# Patient Record
Sex: MALE | Race: WHITE | NOT HISPANIC OR LATINO | URBAN - METROPOLITAN AREA
[De-identification: names, ages, dates, MRNs, and addresses within clinical notes are randomized per-mention and may not be internally consistent; named-entity substitution may affect disease eponyms.]

---

## 2024-11-02 ENCOUNTER — EMERGENCY (EMERGENCY)
Facility: HOSPITAL | Age: 89
LOS: 1 days | Discharge: HOME | End: 2024-11-02
Attending: EMERGENCY MEDICINE | Admitting: EMERGENCY MEDICINE
Payer: COMMERCIAL

## 2024-11-02 VITALS
SYSTOLIC BLOOD PRESSURE: 139 MMHG | DIASTOLIC BLOOD PRESSURE: 77 MMHG | HEIGHT: 70 IN | RESPIRATION RATE: 18 BRPM | TEMPERATURE: 98 F | WEIGHT: 175.05 LBS | OXYGEN SATURATION: 98 % | HEART RATE: 74 BPM

## 2024-11-02 VITALS
OXYGEN SATURATION: 96 % | TEMPERATURE: 98 F | SYSTOLIC BLOOD PRESSURE: 128 MMHG | HEART RATE: 55 BPM | RESPIRATION RATE: 16 BRPM | DIASTOLIC BLOOD PRESSURE: 67 MMHG

## 2024-11-02 DIAGNOSIS — I10 ESSENTIAL (PRIMARY) HYPERTENSION: ICD-10-CM

## 2024-11-02 DIAGNOSIS — Z23 ENCOUNTER FOR IMMUNIZATION: ICD-10-CM
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
## 2024-11-02 DIAGNOSIS — S09.93XA UNSPECIFIED INJURY OF FACE, INITIAL ENCOUNTER: ICD-10-CM

## 2024-11-02 DIAGNOSIS — S80.212A ABRASION, LEFT KNEE, INITIAL ENCOUNTER: ICD-10-CM

## 2024-11-02 DIAGNOSIS — S01.21XA LACERATION WITHOUT FOREIGN BODY OF NOSE, INITIAL ENCOUNTER: ICD-10-CM
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
## 2024-11-02 DIAGNOSIS — Z88.1 ALLERGY STATUS TO OTHER ANTIBIOTIC AGENTS: ICD-10-CM

## 2024-11-02 DIAGNOSIS — Z91.040 LATEX ALLERGY STATUS: ICD-10-CM

## 2024-11-02 DIAGNOSIS — M19.90 UNSPECIFIED OSTEOARTHRITIS, UNSPECIFIED SITE: ICD-10-CM

## 2024-11-02 DIAGNOSIS — M48.00 SPINAL STENOSIS, SITE UNSPECIFIED: ICD-10-CM

## 2024-11-02 DIAGNOSIS — I25.10 ATHEROSCLEROTIC HEART DISEASE OF NATIVE CORONARY ARTERY WITHOUT ANGINA PECTORIS: ICD-10-CM

## 2024-11-02 DIAGNOSIS — Z79.82 LONG TERM (CURRENT) USE OF ASPIRIN: ICD-10-CM

## 2024-11-02 PROCEDURE — 90471 IMMUNIZATION ADMIN: CPT

## 2024-11-02 PROCEDURE — 12011 RPR F/E/E/N/L/M 2.5 CM/<: CPT

## 2024-11-02 PROCEDURE — 82962 GLUCOSE BLOOD TEST: CPT

## 2024-11-02 PROCEDURE — 72125 CT NECK SPINE W/O DYE: CPT | Mod: 26,MC

## 2024-11-02 PROCEDURE — 70450 CT HEAD/BRAIN W/O DYE: CPT | Mod: MC

## 2024-11-02 PROCEDURE — 99284 EMERGENCY DEPT VISIT MOD MDM: CPT | Mod: 25

## 2024-11-02 PROCEDURE — 90715 TDAP VACCINE 7 YRS/> IM: CPT

## 2024-11-02 PROCEDURE — 70486 CT MAXILLOFACIAL W/O DYE: CPT | Mod: 26,MC

## 2024-11-02 PROCEDURE — 70450 CT HEAD/BRAIN W/O DYE: CPT | Mod: 26,MC

## 2024-11-02 PROCEDURE — 72125 CT NECK SPINE W/O DYE: CPT | Mod: MC

## 2024-11-02 PROCEDURE — 70486 CT MAXILLOFACIAL W/O DYE: CPT | Mod: MC

## 2024-11-02 RX ORDER — ACETAMINOPHEN 500 MG
650 TABLET ORAL ONCE
Refills: 0 | Status: COMPLETED | OUTPATIENT
Start: 2024-11-02 | End: 2024-11-02

## 2024-11-02 RX ORDER — CLOSTRIDIUM TETANI TOXOID ANTIGEN (FORMALDEHYDE INACTIVATED), CORYNEBACTERIUM DIPHTHERIAE TOXOID ANTIGEN (FORMALDEHYDE INACTIVATED), BORDETELLA PERTUSSIS TOXOID ANTIGEN (GLUTARALDEHYDE INACTIVATED), BORDETELLA PERTUSSIS FILAMENTOUS HEMAGGLUTININ ANTIGEN (FORMALDEHYDE INACTIVATED), BORDETELLA PERTUSSIS PERTACTIN ANTIGEN, AND BORDETELLA PERTUSSIS FIMBRIAE 2/3 ANTIGEN 5; 2; 2.5; 5; 3; 5 [LF]/.5ML; [LF]/.5ML; UG/.5ML; UG/.5ML; UG/.5ML; UG/.5ML
0.5 INJECTION, SUSPENSION INTRAMUSCULAR ONCE
Refills: 0 | Status: COMPLETED | OUTPATIENT
Start: 2024-11-02 | End: 2024-11-02

## 2024-11-02 RX ADMIN — CLOSTRIDIUM TETANI TOXOID ANTIGEN (FORMALDEHYDE INACTIVATED), CORYNEBACTERIUM DIPHTHERIAE TOXOID ANTIGEN (FORMALDEHYDE INACTIVATED), BORDETELLA PERTUSSIS TOXOID ANTIGEN (GLUTARALDEHYDE INACTIVATED), BORDETELLA PERTUSSIS FILAMENTOUS HEMAGGLUTININ ANTIGEN (FORMALDEHYDE INACTIVATED), BORDETELLA PERTUSSIS PERTACTIN ANTIGEN, AND BORDETELLA PERTUSSIS FIMBRIAE 2/3 ANTIGEN 0.5 MILLILITER(S): 5; 2; 2.5; 5; 3; 5 INJECTION, SUSPENSION INTRAMUSCULAR at 17:26

## 2024-11-02 RX ADMIN — Medication 650 MILLIGRAM(S): at 17:26

## 2024-11-02 NOTE — ED ADULT TRIAGE NOTE - CHIEF COMPLAINT QUOTE
Single mechanical GLF with headstrike 1 hour PTA. Pt reports hx of spinal stenosis and felt his legs tense up and fell thereafter. Denies preceding dizziness, weakness, SOB, or chest pain. Denies active dizziness or chest pain. BEFAST (-). Laceration noted to nose and abrasion above R eye. Denies LOC. Reports daily ASA use. Alert and oriented at time of triage.

## 2024-11-02 NOTE — ED PROVIDER NOTE - PHYSICAL EXAMINATION
CONSTITUTIONAL: Well-appearing;  in no apparent distress.   HEAD: Normocephalic;   EYES: PERRL; EOM intact; conjunctiva and sclera clear  ENT: +1.5 cm lac to proximal nasal bridge, +abrasion to tip of nose. no septal hematoma. +abrasion above R upper lip. +abrasion above R forehead. no facial bone tenderness.   NECK: Supple; non-tender; no cspine tenderness   CARDIOVASCULAR: rrr,  RESPIRATORY: Breathing easily;   MSK: FROM at all extremities, normal tone; abrasion to L knee, no bony tenderness, FROM. No tenderness to hip. Pt ambulatory with cane   EXT: No cyanosis or edema; N/V intact  SKIN: Normal for age and race; warm; dry; good turgor; no apparent lesions or rash.  Neuro: AAO x 3, CN II-XII intact, normal speech, strength 5/5 bilateral upper and lower extremities, sensation intact, cerebellum intact, no ataxia, follows commands appropriately

## 2024-11-02 NOTE — ED PROVIDER NOTE - PATIENT PORTAL LINK FT
You can access the FollowMyHealth Patient Portal offered by [facility] by registering at the following website: [email or web address]. By joining [employer]’s FollowMyHealth portal, you will also be able to view your health information using other applications (apps) compatible with our system.

## 2024-11-02 NOTE — ED PROVIDER NOTE - CLINICAL SUMMARY MEDICAL DECISION MAKING FREE TEXT BOX
89 yo M with pmh of OA, spinal stenosis, HTN, nonobstructive CAD c/o fall with facial trauma. Pt states he has been having problems with his back and knees and his legs cramped and he fell. Pt hit his face on the pavement. No LOC. Denies HA, neck pain, dizziness, back pain, cp, hip pain. Neuro intact. +1.5 cm lac to proximal nasal bridge, +abrasion to tip of nose. no septal hematoma. +abrasion above R upper lip. +abrasion above R forehead. no facial bone tenderness. tetanus updated. laceration repaired. CTH/cspine/facial bones pending 89 yo M with pmh of OA, spinal stenosis, HTN, nonobstructive CAD c/o fall with facial trauma. Pt states he has been having problems with his back and knees and his legs cramped and he fell. Pt hit his face on the pavement. No LOC. Denies HA, neck pain, dizziness, back pain, cp, hip pain. Neuro intact. +1.5 cm lac to proximal nasal bridge, +abrasion to tip of nose. no septal hematoma. +abrasion above R upper lip. +abrasion above R forehead. no facial bone tenderness. tetanus updated. laceration repaired. CTH/cspine/facial bones pending      20:45: 89 yo M with pmh of OA, spinal stenosis, HTN, nonobstructive CAD c/o fall with facial trauma. Pt states he has been having problems with his back and knees and his legs cramped and he fell. Pt hit his face on the pavement. No LOC. Denies HA, neck pain, dizziness, back pain, cp, hip pain. Neuro intact. +1.5 cm lac to proximal nasal bridge, +abrasion to tip of nose. no septal hematoma. +abrasion above R upper lip. +abrasion above R forehead. no facial bone tenderness. tetanus updated. laceration repaired. CTH/cspine/facial bones pending      20:45:Patient has been sitting in my work area and has remained well.  Has been reading the paper without complaints.  Awaiting Ct results.    Discussed results in detail with the patient as well as strict return precautions and wound care/fx care instructions.  patient verbalized understanding of discussion and plan.  Has good family support and someone coming to ER to go home with him.

## 2024-11-02 NOTE — ED PROVIDER NOTE - NSFOLLOWUPINSTRUCTIONS_ED_ALL_ED_FT
Please keep wound clean and dry for 24 hours. Return in 7 days for suture removal. Return to ED sooner for worsening fever, chills, swelling, redness, warmth, drainage or any other concerning symptoms.    Laceration    A laceration is a cut that goes through all of the layers of the skin and into the tissue that is right under the skin. Some lacerations heal on their own. Others need to be closed with skin adhesive strips, skin glue, stitches (sutures), or staples. Proper laceration care minimizes the risk of infection and helps the laceration to heal better.  If non-absorbable stitches or staples have been placed, they must be taken out within the time frame instructed by your healthcare provider.    SEEK IMMEDIATE MEDICAL CARE IF YOU HAVE ANY OF THE FOLLOWING SYMPTOMS: swelling around the wound, worsening pain, drainage from the wound, red streaking going away from your wound, inability to move finger or toe near the laceration, or discoloration of skin near the laceration.    Closed Head Injury    A closed head injury is an injury to your head that may or may not involve a traumatic brain injury (TBI). Symptoms of TBI can be short or long lasting and include headache, dizziness, interference with memory or speech, fatigue, confusion, changes in sleep, mood changes, nausea, depression/anxiety, and dulling of senses. Make sure to obtain proper rest which includes getting plenty of sleep, avoiding excessive visual stimulation, and avoiding activities that may cause physical or mental stress. Avoid any situation where there is potential for another head injury, including sports.    SEEK IMMEDIATE MEDICAL CARE IF YOU HAVE ANY OF THE FOLLOWING SYMPTOMS: unusual drowsiness, vomiting, severe dizziness, seizures, lightheadedness, muscular weakness, different pupil sizes, visual changes, or clear or bloody discharge from your ears or nose. Please keep wound clean and dry for 24 hours. Return in 7 days for suture removal. Return to ED sooner for worsening fever, chills, swelling, redness, warmth, drainage or any other concerning symptoms.    Laceration    A laceration is a cut that goes through all of the layers of the skin and into the tissue that is right under the skin. Some lacerations heal on their own. Others need to be closed with skin adhesive strips, skin glue, stitches (sutures), or staples. Proper laceration care minimizes the risk of infection and helps the laceration to heal better.  If non-absorbable stitches or staples have been placed, they must be taken out within the time frame instructed by your healthcare provider.    SEEK IMMEDIATE MEDICAL CARE IF YOU HAVE ANY OF THE FOLLOWING SYMPTOMS: swelling around the wound, worsening pain, drainage from the wound, red streaking going away from your wound, inability to move finger or toe near the laceration, or discoloration of skin near the laceration.    Closed Head Injury    A closed head injury is an injury to your head that may or may not involve a traumatic brain injury (TBI). Symptoms of TBI can be short or long lasting and include headache, dizziness, interference with memory or speech, fatigue, confusion, changes in sleep, mood changes, nausea, depression/anxiety, and dulling of senses. Make sure to obtain proper rest which includes getting plenty of sleep, avoiding excessive visual stimulation, and avoiding activities that may cause physical or mental stress. Avoid any situation where there is potential for another head injury, including sports.    SEEK IMMEDIATE MEDICAL CARE IF YOU HAVE ANY OF THE FOLLOWING SYMPTOMS: unusual drowsiness, vomiting, severe dizziness, seizures, lightheadedness, muscular weakness, different pupil sizes, visual changes, or clear or bloody discharge from your ears or nose.    YOU HAD A CAT SCAN OF YOUR NECK AND OF YOUR SKULL AND BRAIN  and FACE TODAY.    YOU HAVE A SMALL BREAK IN THE BONE OF YOUR NOSE.  THIS SHOULD HEAL ON ITS OWN, BUT YOU CAN FOLLOW UP WITH AN EAR NOSE AND THROAT DOCTOR WITHIN A WEEK.     HOWEVER, CAT SCANS DO NOT SHOW IF A PERSON HAS A CONCUSSION.  A CONCUSSION IS A GROUP OF SYMPTOMS THAT CAN DEVELOPED AFTER A PERSON HAS A HEAD INJURY.  THESE SYMPTOMS INCLUDE: HEADACHE, NAUSEA, DIZZINESS, A FOGGY FEELING.  THEY CAN LAST ANYWHERE FROM A DAY TO A WEEK OR LONGER.      IT IS IMPORTANT TO HAVE A FOLLOW UP VISIT WITH YOUR DOCTOR THIS WEEK TO CHECK ON YOUR SYMPTOMS.  IF YOU DO DEVELOP A CONCUSSION, THEY WILL NEED TO MONITOR YOU FOR IMPROVEMENT AND TO DETERMINE IF YOU NEED TO SEE A NEUROLOGIST FOR SPECIALTY CARE.     FOR NOW, REST AS NEEDED AND KEEP WELL-HYDRATED.  TAKE TYLENOL FOR PAIN.  YOU DO NOT NEED TO STAY IN BED.  YOU CAN DO YOUR NORMAL ACTIVITIES.  BE MINDFUL OF THINGS THAT MAKE YOU HAVE ANY OF THE ABOVE SYMPTOMS.  MANY PEOPLE FIND THAT USING THEIR PHONE OR WATCHING TV CAN TRIGGER SYMPTOMS.  IF THIS HAPPENS, DO THESE ACTIVITIES FOR SHORTER PERIODS OF TIME.     BLEEDING IN THE BRAIN CAN SOMETIMES SHOW UP AT A LATER TIME, EVEN A MONTH OR LONGER AFTER THE ORIGINAL INJURY.  IT IS UNUSUAL, BUT IMPORTANT TO KNOW ABOUT. IF YOU NOTICE THAT YOU ARE HAVING HEADACHES THAT ARE NEW OR UNUSUAL FOR YOU OR THAT YOU ARE NAUSEAS, OFF-BALANCE, HAVE BLURRED VISION, TROUBLE WITH YOUR MEMORY OR ANY OTHER NEW OR CONCERNING SYMPTOMS, YOU NEED TO BE SEEN IN THE EMERGENCY ROOM TO HAVE ANOTHER CAT SCAN.     RETURN TO THE EMERGENCY ROOM IMMEDIATELY FOR:  VOMITING  SEVERE HEADACHE  VOMITING  NUMBNESS OR WEAKNESS TO THE FACE, ARMS OR LEGS  TROUBLE WALKING, TALKING OR SEEING  ANY NEW, WORSENING OR CONCERNING SYMPTOMS.

## 2024-11-02 NOTE — ED PROVIDER NOTE - OBJECTIVE STATEMENT
89 yo M with pmh of OA, spinal stenosis, HTN, nonobstructive CAD c/o fall with facial trauma. Pt states he has been having problems with his back and knees and his legs cramped and he fell. Pt hit his face on the pavement. No LOC. Pt was able to get up with assistance and then walked to the ED. Denies HA, neck pain, dizziness, back pain, cp, hip pain. Unknown last tetanus. on baby asa, no other AC. unknown last tetanus

## 2024-11-02 NOTE — ED ADULT NURSE NOTE - OBJECTIVE STATEMENT
Pt is a 89 yo M here for fall with HS. Reports ongoing weakness in both LEs, had outpatient MRI yesterday. Takes daily low dose ASA. Denies LOC. Denies dizziness, lightheadedness, cp, sob prior to fall.  On exam, pt in NAD, speaking in full and complete sentences, ambulatory into dept. Abrasions to R brow and nose, with small lac to bridge of nose; minimal active bleeding, gauze placed. Abrasion to L knee and ecchymosis to R knee, no active bleeding.

## 2024-11-10 ENCOUNTER — EMERGENCY (EMERGENCY)
Facility: HOSPITAL | Age: 89
LOS: 1 days | Discharge: HOME | End: 2024-11-10
Admitting: EMERGENCY MEDICINE

## 2024-11-10 VITALS
DIASTOLIC BLOOD PRESSURE: 56 MMHG | TEMPERATURE: 98 F | SYSTOLIC BLOOD PRESSURE: 117 MMHG | RESPIRATION RATE: 16 BRPM | OXYGEN SATURATION: 95 % | WEIGHT: 171.96 LBS | HEART RATE: 75 BPM | HEIGHT: 70 IN

## 2024-11-10 PROCEDURE — 99212 OFFICE O/P EST SF 10 MIN: CPT

## 2024-11-10 PROCEDURE — L9995: CPT

## 2024-11-10 NOTE — ED PROVIDER NOTE - PHYSICAL EXAMINATION
Sutures located over bridge of nose. No swelling erythema, ecchymosis, or purulent drainage. About 6 sutures in placed. Well healed.

## 2024-11-10 NOTE — ED ADULT TRIAGE NOTE - CHIEF COMPLAINT QUOTE
89 y.o. Male presents to ED for suture removal on bridge of nose s/p mechanical fall on 11/2/24. denies fevers/chills, cp, sob, bleeding or drainage from suture area. A/ox4. ambulatory with cane.

## 2024-11-10 NOTE — ED PROVIDER NOTE - PSYCHIATRIC NEGATIVE STATEMENT, MLM
oxyCODONE 5 mg/5 mL oral solution: 5 milliliter(s) orally every 4 hours as needed for  severe pain MDD: 6 doses  
no known mental health issues.

## 2024-11-10 NOTE — ED PROVIDER NOTE - NSFOLLOWUPINSTRUCTIONS_ED_ALL_ED_FT
Keep area dry and clean. Apply a thin amount of bacitracin at night once face is cleaned and use steri strips during the day if needed.

## 2024-11-10 NOTE — ED PROVIDER NOTE - CLINICAL SUMMARY MEDICAL DECISION MAKING FREE TEXT BOX
89 y/o m present to ED for suture removal. No ac signs of infection. Sutures were removed. Steri strips applied.

## 2024-11-10 NOTE — ED PROVIDER NOTE - PATIENT PORTAL LINK FT
You can access the FollowMyHealth Patient Portal offered by [facility] by registering at the following website: [email or web address]. By joining [employer]’s FollowMyHealth portal, you will also be able to view your health information using other applications (apps) compatible with our system.

## 2024-11-10 NOTE — ED PROVIDER NOTE - OBJECTIVE STATEMENT
89 y/o m present to ED for suture removal. Patient report of sutures in place for one week. Denies any complications with sutures. TD is up to  date.